# Patient Record
(demographics unavailable — no encounter records)

---

## 2024-11-27 NOTE — HISTORY OF PRESENT ILLNESS
[Regular Periods] : regular periods [FreeTextEntry2] : Marina is a 20-year 8-month-old female here for follow up for autoimmune thyroiditis. Patient takes 75 mcg levothyroxine daily on an empty stomach. She denied missed doses, dry skin, constipation, temperature intolerance. She reports excessive hair loss, attributes to stress in school and her mom being diagnosed with breast cancer recently.   Marina has a hx Vitamin D deficiency. She stopped taking Vitamin D over the summer.  She was prescribed Concerta 40 mg daily for ADHD by her Psychiatrist.   Marina is a [employer] in [employer], wants to become an ophthalmologist. She volunteers in an eye clinic.   [FreeTextEntry1] : LMP 11/27/24

## 2024-11-27 NOTE — REVIEW OF SYSTEMS
[Wgt Gain (___ Lbs)] : recent [unfilled] lb weight gain [Constipation] : constipation [Cold Intolerance] : cold intolerant [Change in Activity] : no change in activity [Rash] : no rash [Skin Lesions] : no skin lesions [Chest Pain] : no chest pain or discomfort [Palpitations] : no palpitations [Cough] : no cough [Change in Appetite] : no change in appetite [Abdominal Pain] : no abdominal pain [Sleep Disturbances] : ~T no sleep disturbances [Headache] : no headache

## 2024-11-27 NOTE — ASSESSMENT
[FreeTextEntry1] : 20 year 8-month-old female with autoimmune thyroiditis clinically euthyroid on levothyroxine supplementation. Patient has a hx Vitamin D deficiency, currently not on Vitamin D supplementation.   Plan: Repeat TFT's and Vitamin D levels were obtained in the office today.  Will f/u results and contact patient to discuss.  RTC 6 month.

## 2024-11-27 NOTE — DATA REVIEWED
[FreeTextEntry1] : (6/6/24)  CBC WNL, TSH 2.13, free T4  1.3, cholesterol 132, LDL Chol  69, HDL Chol 48, TG 66, 25-OH Vitamin D 63  (4/3/24) CBC/CMP WNL, TSH 2.18, free T4 1.1, T4 9.2, HbA1C 5.3%, 25-OH Vitamin D 52  (9/13/23) TSH 2.97, T4  8.7, free T4  1.1, T3  142, 25-OH Vitamin D 31  (3/18/23) TSH 3.22, T3  120, free T4  1.4, T4  9.3, Thyroid Peroxidase Ab 546(H), Thyroglobulin Ab <1.   (8/25/22) TSH 5.76(H), free T4  1.4, T3 139, Thyroid Peroxidase Ab 438(H), 25-OH Vitamin D 31  (3/5/22) 25-OH Vitamin D 18 (L), TSH 4.52, free T4  1.8 , T3  134.   (9/13/21) TSH 5.82, T4 10.9, free T4 1.4, T3  127 Thyroid Peroxidase Ab 674 (H) Thyroglobulin Ab 1  (3/5/21) TSH 2.47, free T4  1.0

## 2024-11-27 NOTE — PHYSICAL EXAM
[Healthy Appearing] : healthy appearing [Overweight] : overweight [Normal Appearance] : normal appearance [Well formed] : well formed [Normally Set] : normally set [WNL for age] : within normal limits of age [Normal S1 and S2] : normal S1 and S2 [Clear to Ausculation Bilaterally] : clear to auscultation bilaterally [Abdomen Soft] : soft [Acanthosis Nigricans___] : no acanthosis nigricans [Goiter] : no goiter [Murmur] : no murmurs [Normal] : grossly intact

## 2025-05-28 NOTE — PHYSICAL EXAM
[Healthy Appearing] : healthy appearing [Overweight] : overweight [Normal Appearance] : normal appearance [Well formed] : well formed [Normally Set] : normally set [WNL for age] : within normal limits of age [Normal S1 and S2] : normal S1 and S2 [Clear to Ausculation Bilaterally] : clear to auscultation bilaterally [Abdomen Soft] : soft [Normal] : normal  [Acanthosis Nigricans___] : no acanthosis nigricans [Goiter] : no goiter [Murmur] : no murmurs

## 2025-05-28 NOTE — HISTORY OF PRESENT ILLNESS
[Regular Periods] : regular periods [FreeTextEntry2] : Marina is a 21-year 2-month-old female here for follow up for autoimmune thyroiditis. Patient takes levothyroxine 75 mcg daily. She reports palpitations and feeling lightheaded when getting up from bed.  She has dry skin on her cheeks. She denied constipation, hair loss, temperature intolerance.   Marina has a hx Vitamin D deficiency. She has not been taking Vitamin D consistently.  Patient taking Concerta 40 mg daily, prescribed by her Psychiatrist for ADHD.  Social Hx: Marina finished [employer] year in [employer], [employer] medical school, wants to become an ophthalmologist. 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    [FreeTextEntry1] : LMP 4/17/25

## 2025-05-28 NOTE — DATA REVIEWED
[FreeTextEntry1] : (5/27/25)  CBC/CMP WNL, TSH 2.98, free T4  10.7, 25-OH Vitamin D 23 (L)  (6/6/24)  CBC WNL, TSH 2.13, free T4  1.3, cholesterol 132, LDL Chol  69, HDL Chol 48, TG 66, 25-OH Vitamin D 63  (4/3/24) CBC/CMP WNL, TSH 2.18, free T4 1.1, T4 9.2, HbA1C 5.3%, 25-OH Vitamin D 52  (9/13/23) TSH 2.97, T4  8.7, free T4  1.1, T3  142, 25-OH Vitamin D 31  (3/18/23) TSH 3.22, T3  120, free T4  1.4, T4  9.3, Thyroid Peroxidase Ab 546(H), Thyroglobulin Ab <1.   (8/25/22) TSH 5.76(H), free T4  1.4, T3 139, Thyroid Peroxidase Ab 438(H), 25-OH Vitamin D 31  (3/5/22) 25-OH Vitamin D 18 (L), TSH 4.52, free T4  1.8 , T3  134.   (9/13/21) TSH 5.82, T4 10.9, free T4 1.4, T3  127 Thyroid Peroxidase Ab 674 (H) Thyroglobulin Ab 1  (3/5/21) TSH 2.47, free T4  1.0

## 2025-05-28 NOTE — ASSESSMENT
[FreeTextEntry1] : 21 year 2-month-old female with autoimmune thyroiditis clinically and biochemically euthyroid on levothyroxine supplementation. Patient has a hx Vitamin D insufficiency, currently not on Vitamin D supplementation.   Plan: Continue levothyroxine 75 mcg daily.  Repeat TFT's prior to next visit.  Patient to schedule appointment with adult Endocrinologist RTC 6 month.